# Patient Record
Sex: FEMALE | Race: WHITE | NOT HISPANIC OR LATINO | Employment: UNEMPLOYED | URBAN - METROPOLITAN AREA
[De-identification: names, ages, dates, MRNs, and addresses within clinical notes are randomized per-mention and may not be internally consistent; named-entity substitution may affect disease eponyms.]

---

## 2019-02-11 ENCOUNTER — ROUTINE PRENATAL (OUTPATIENT)
Dept: PERINATAL CARE | Facility: CLINIC | Age: 39
End: 2019-02-11
Payer: COMMERCIAL

## 2019-02-11 VITALS
DIASTOLIC BLOOD PRESSURE: 82 MMHG | WEIGHT: 270 LBS | SYSTOLIC BLOOD PRESSURE: 148 MMHG | BODY MASS INDEX: 44.98 KG/M2 | HEIGHT: 65 IN | HEART RATE: 96 BPM

## 2019-02-11 DIAGNOSIS — O24.111 PRE-EXISTING TYPE 2 DIABETES MELLITUS DURING PREGNANCY IN FIRST TRIMESTER: Primary | ICD-10-CM

## 2019-02-11 DIAGNOSIS — O99.210 OBESITY AFFECTING PREGNANCY, ANTEPARTUM: ICD-10-CM

## 2019-02-11 DIAGNOSIS — O10.911 CHRONIC HYPERTENSION IN OBSTETRIC CONTEXT IN FIRST TRIMESTER: ICD-10-CM

## 2019-02-11 DIAGNOSIS — Z36.82 ENCOUNTER FOR NUCHAL TRANSLUCENCY TESTING: ICD-10-CM

## 2019-02-11 DIAGNOSIS — O09.521 ELDERLY MULTIGRAVIDA IN FIRST TRIMESTER: ICD-10-CM

## 2019-02-11 DIAGNOSIS — Z3A.13 13 WEEKS GESTATION OF PREGNANCY: ICD-10-CM

## 2019-02-11 PROCEDURE — 76813 OB US NUCHAL MEAS 1 GEST: CPT | Performed by: OBSTETRICS & GYNECOLOGY

## 2019-02-11 PROCEDURE — 76801 OB US < 14 WKS SINGLE FETUS: CPT | Performed by: OBSTETRICS & GYNECOLOGY

## 2019-02-11 PROCEDURE — 99242 OFF/OP CONSLTJ NEW/EST SF 20: CPT | Performed by: OBSTETRICS & GYNECOLOGY

## 2019-02-11 RX ORDER — BLOOD SUGAR DIAGNOSTIC
STRIP MISCELLANEOUS
COMMUNITY
Start: 2018-12-27

## 2019-02-11 RX ORDER — LANOLIN ALCOHOL/MO/W.PET/CERES
400 CREAM (GRAM) TOPICAL DAILY
COMMUNITY

## 2019-02-11 RX ORDER — INSULIN HUMAN 100 [IU]/ML
INJECTION, SUSPENSION SUBCUTANEOUS
COMMUNITY
Start: 2018-12-27

## 2019-02-11 RX ORDER — PEN NEEDLE, DIABETIC 31 GX5/16"
NEEDLE, DISPOSABLE MISCELLANEOUS
COMMUNITY
Start: 2018-12-27

## 2019-02-11 RX ORDER — ASPIRIN 81 MG/1
162 TABLET ORAL DAILY
Qty: 180 TABLET | Refills: 3 | Status: SHIPPED | OUTPATIENT
Start: 2019-02-11

## 2019-02-11 RX ORDER — VITAMIN A, VITAMIN C, VITAMIN D-3, VITAMIN E, VITAMIN B-1, VITAMIN B-2, NIACIN, VITAMIN B-6, CALCIUM, IRON, ZINC, COPPER 4000; 120; 400; 22; 1.84; 3; 20; 10; 1; 12; 200; 27; 25; 2 [IU]/1; MG/1; [IU]/1; MG/1; MG/1; MG/1; MG/1; MG/1; MG/1; UG/1; MG/1; MG/1; MG/1; MG/1
TABLET ORAL
COMMUNITY
Start: 2018-12-24

## 2019-02-11 RX ORDER — INSULIN DETEMIR 100 [IU]/ML
INJECTION, SOLUTION SUBCUTANEOUS
COMMUNITY
Start: 2018-12-27

## 2019-02-11 RX ORDER — INSULIN LISPRO 100 [IU]/ML
INJECTION, SOLUTION INTRAVENOUS; SUBCUTANEOUS
COMMUNITY
Start: 2018-12-27

## 2019-02-11 NOTE — PROGRESS NOTES
The patient was seen today for an ultrasound  Please see ultrasound report (located under Ob Procedures) for additional details  Thank you very much for allowing us to participate in the care of this very nice patient  Should you have any questions, please do not hesitate to contact me  MD [place]  Attending Physician, Ilya

## 2019-02-12 ENCOUNTER — TELEPHONE (OUTPATIENT)
Dept: PERINATAL CARE | Facility: CLINIC | Age: 39
End: 2019-02-12

## 2019-02-12 NOTE — TELEPHONE ENCOUNTER
Pt was left a message on her vm in response to her question of the low dase asa that Dr Aman Maria ordered  She was instructed to take 162mg daily until delivery  Pt was also instructed to call the [facility] if she had any further questions

## 2019-02-19 ENCOUNTER — TELEPHONE (OUTPATIENT)
Dept: PERINATAL CARE | Facility: CLINIC | Age: 39
End: 2019-02-19

## 2019-02-19 NOTE — LETTER
02/19/19  Paris Fowler  1980    Thank you for completing the cell-free DNA screen ("non-invasive prenatal screening" or "[id number]")  To obtain comprehensive screening results, please complete blood work for MSAFP (maternal-serum alpha fetoprotein) between the weeks of 3/5/19 to 4/2/19  Based on your insurance coverage, please use one of the following locations  Call our office for any questions at [phone]      Franck Alvarado [facility]   [facility], [facility], [address] [facility]   Phone: [address]  [address] [facility], [address]    Phone: [address], [address] [place] [address]  Phone: [phone] [address]  [facility], [address]   Phone: [phone] Alton Dos Santos for lab)    [address] [facility]  [facility], [address]; [place], [address]   Phone: [phone]    [address] [facility]  [address] [facility], [place] [address]   Phone: [phone]    Sincerely,    Derick Bar RN

## 2019-02-19 NOTE — TELEPHONE ENCOUNTER
Voicemail message left on # from [facility] communication consent with normal results of [id number]  Gender not left on message  Instructions for MSAFP left and TRF mailed  Advised to call nurse line with any questions

## 2019-02-19 NOTE — LETTER
Name: Mandie Mann  : 1980  MRN: [id number]    To:   Centralized Faxing Team [phone]      The attached documents are complete  Please scan and add into the patient's chart  No other action is needed at this time  Please call us with any questions at [phone]      Eric Lara RN

## 2019-03-05 ENCOUNTER — APPOINTMENT (OUTPATIENT)
Dept: LAB | Facility: HOSPITAL | Age: 39
End: 2019-03-05
Payer: COMMERCIAL

## 2019-03-05 ENCOUNTER — TRANSCRIBE ORDERS (OUTPATIENT)
Dept: ADMINISTRATIVE | Facility: HOSPITAL | Age: 39
End: 2019-03-05

## 2019-03-05 DIAGNOSIS — Z36.9 RESEARCH REQUESTED ANTENATAL ULTRASOUND SCAN: ICD-10-CM

## 2019-03-05 DIAGNOSIS — Z36.9 RESEARCH REQUESTED ANTENATAL ULTRASOUND SCAN: Primary | ICD-10-CM

## 2019-03-05 PROCEDURE — 36415 COLL VENOUS BLD VENIPUNCTURE: CPT

## 2019-03-05 PROCEDURE — 82105 ALPHA-FETOPROTEIN SERUM: CPT

## 2019-03-06 ENCOUNTER — ULTRASOUND (OUTPATIENT)
Dept: PERINATAL CARE | Facility: CLINIC | Age: 39
End: 2019-03-06
Payer: COMMERCIAL

## 2019-03-06 VITALS
WEIGHT: 274.4 LBS | HEIGHT: 65 IN | DIASTOLIC BLOOD PRESSURE: 80 MMHG | BODY MASS INDEX: 45.72 KG/M2 | SYSTOLIC BLOOD PRESSURE: 138 MMHG | HEART RATE: 88 BPM

## 2019-03-06 DIAGNOSIS — Z36.3 ENCOUNTER FOR ANTENATAL SCREENING FOR MALFORMATIONS: ICD-10-CM

## 2019-03-06 DIAGNOSIS — O24.112 PRE-EXISTING TYPE 2 DIABETES MELLITUS DURING PREGNANCY IN SECOND TRIMESTER: Primary | ICD-10-CM

## 2019-03-06 DIAGNOSIS — Z3A.16 16 WEEKS GESTATION OF PREGNANCY: ICD-10-CM

## 2019-03-06 DIAGNOSIS — O10.912 CHRONIC HYPERTENSION IN OBSTETRIC CONTEXT IN SECOND TRIMESTER: ICD-10-CM

## 2019-03-06 PROCEDURE — 99212 OFFICE O/P EST SF 10 MIN: CPT | Performed by: OBSTETRICS & GYNECOLOGY

## 2019-03-06 PROCEDURE — 76805 OB US >/= 14 WKS SNGL FETUS: CPT | Performed by: OBSTETRICS & GYNECOLOGY

## 2019-03-06 RX ORDER — FERROUS SULFATE 325(65) MG
325 TABLET ORAL
COMMUNITY

## 2019-03-06 NOTE — PATIENT INSTRUCTIONS
Thank you for choosing [place] [address] for your  care today  If you have any questions about your ultrasound or care, please do not hesitate to contact us or your primary obstetrician  Continue taking your aspirin (Ideally 162mg though 81mg is acceptable) daily for prevention of preeclampsia  If you have asthma and notice an increase in symptom frequency or severity, consider stopping this medication  Please try to keep your weight gain to 11-20 pounds this pregnancy; this is best accomplished by regular aerobic exercise and healthy eating  Pregnant women should aim for 150 minutes weekly of exercise (example, 30 minutes five times per week)

## 2019-03-06 NOTE — PROGRESS NOTES
David Richards: Ms Kenyetta Ba was seen today at 16w2d for anatomic survey ultrasound  See ultrasound report under "OB Procedures" tab  Please don't hesitate to contact our office with any concerns or questions    George Hernandez MD

## 2019-03-07 ENCOUNTER — TELEPHONE (OUTPATIENT)
Dept: PERINATAL CARE | Facility: CLINIC | Age: 39
End: 2019-03-07

## 2019-03-07 LAB
2ND TRIMESTER 4 SCREEN SERPL-IMP: NORMAL
AFP ADJ MOM SERPL: 0.97
AFP INTERP AMN-IMP: NORMAL
AFP INTERP SERPL-IMP: NORMAL
AFP INTERP SERPL-IMP: NORMAL
AFP SERPL-MCNC: 17.7 NG/ML
AGE AT DELIVERY: 40.5 YR
GA METHOD: NORMAL
GA: 16.1 WEEKS
IDDM PATIENT QL: YES
MULTIPLE PREGNANCY: NO
NEURAL TUBE DEFECT RISK FETUS: 4144 %

## 2019-03-07 NOTE — TELEPHONE ENCOUNTER
Left VMM on # provided on communication consent  Results of MSAFP provided and instructed to call office with questions

## 2019-03-07 NOTE — TELEPHONE ENCOUNTER
----- Message from Wendy Ramos MD sent at 3/7/2019  9:21 AM EST -----  [facility] RN staff, I've reviewed this MSAFP result which is normal, can you call her regarding this result? Thank you    Wendy Ramos MD

## 2019-04-03 ENCOUNTER — ROUTINE PRENATAL (OUTPATIENT)
Dept: PERINATAL CARE | Facility: CLINIC | Age: 39
End: 2019-04-03
Payer: COMMERCIAL

## 2019-04-03 VITALS
HEIGHT: 65 IN | HEART RATE: 90 BPM | DIASTOLIC BLOOD PRESSURE: 78 MMHG | SYSTOLIC BLOOD PRESSURE: 138 MMHG | BODY MASS INDEX: 44.98 KG/M2 | WEIGHT: 270 LBS

## 2019-04-03 DIAGNOSIS — O99.210 OBESITY AFFECTING PREGNANCY, ANTEPARTUM: Primary | ICD-10-CM

## 2019-04-03 DIAGNOSIS — Z3A.20 20 WEEKS GESTATION OF PREGNANCY: ICD-10-CM

## 2019-04-03 DIAGNOSIS — O24.112 PRE-EXISTING TYPE 2 DIABETES MELLITUS DURING PREGNANCY IN SECOND TRIMESTER: ICD-10-CM

## 2019-04-03 DIAGNOSIS — O09.521 ELDERLY MULTIGRAVIDA IN FIRST TRIMESTER: ICD-10-CM

## 2019-04-03 DIAGNOSIS — Z36.86 ENCOUNTER FOR ANTENATAL SCREENING FOR CERVICAL LENGTH: ICD-10-CM

## 2019-04-03 DIAGNOSIS — O10.912 CHRONIC HYPERTENSION IN OBSTETRIC CONTEXT IN SECOND TRIMESTER: ICD-10-CM

## 2019-04-03 PROCEDURE — 76817 TRANSVAGINAL US OBSTETRIC: CPT | Performed by: OBSTETRICS & GYNECOLOGY

## 2019-04-03 PROCEDURE — 99212 OFFICE O/P EST SF 10 MIN: CPT | Performed by: OBSTETRICS & GYNECOLOGY

## 2019-04-03 PROCEDURE — 76811 OB US DETAILED SNGL FETUS: CPT | Performed by: OBSTETRICS & GYNECOLOGY

## 2019-04-03 RX ORDER — DOCUSATE SODIUM 100 MG/1
100 CAPSULE, LIQUID FILLED ORAL DAILY
COMMUNITY
End: 2019-06-10 | Stop reason: ALTCHOICE

## 2019-04-15 ENCOUNTER — ROUTINE PRENATAL (OUTPATIENT)
Dept: PERINATAL CARE | Facility: CLINIC | Age: 39
End: 2019-04-15
Payer: COMMERCIAL

## 2019-04-15 VITALS
BODY MASS INDEX: 45.82 KG/M2 | HEART RATE: 85 BPM | HEIGHT: 65 IN | WEIGHT: 275 LBS | SYSTOLIC BLOOD PRESSURE: 130 MMHG | DIASTOLIC BLOOD PRESSURE: 70 MMHG

## 2019-04-15 DIAGNOSIS — O10.912 CHRONIC HYPERTENSION IN OBSTETRIC CONTEXT IN SECOND TRIMESTER: ICD-10-CM

## 2019-04-15 DIAGNOSIS — O24.112 PRE-EXISTING TYPE 2 DIABETES MELLITUS DURING PREGNANCY IN SECOND TRIMESTER: ICD-10-CM

## 2019-04-15 DIAGNOSIS — Z3A.22 22 WEEKS GESTATION OF PREGNANCY: ICD-10-CM

## 2019-04-15 PROCEDURE — 93325 DOPPLER ECHO COLOR FLOW MAPG: CPT | Performed by: OBSTETRICS & GYNECOLOGY

## 2019-04-15 PROCEDURE — 76825 ECHO EXAM OF FETAL HEART: CPT | Performed by: OBSTETRICS & GYNECOLOGY

## 2019-04-15 PROCEDURE — 76827 ECHO EXAM OF FETAL HEART: CPT | Performed by: OBSTETRICS & GYNECOLOGY

## 2019-05-13 ENCOUNTER — ULTRASOUND (OUTPATIENT)
Dept: PERINATAL CARE | Facility: CLINIC | Age: 39
End: 2019-05-13
Payer: COMMERCIAL

## 2019-05-13 VITALS
HEIGHT: 65 IN | HEART RATE: 80 BPM | DIASTOLIC BLOOD PRESSURE: 68 MMHG | WEIGHT: 281 LBS | BODY MASS INDEX: 46.82 KG/M2 | SYSTOLIC BLOOD PRESSURE: 126 MMHG

## 2019-05-13 DIAGNOSIS — Z3A.26 26 WEEKS GESTATION OF PREGNANCY: ICD-10-CM

## 2019-05-13 DIAGNOSIS — E66.01 MATERNAL MORBID OBESITY IN SECOND TRIMESTER, ANTEPARTUM (HCC): ICD-10-CM

## 2019-05-13 DIAGNOSIS — O10.912 CHRONIC HYPERTENSION IN OBSTETRIC CONTEXT IN SECOND TRIMESTER: ICD-10-CM

## 2019-05-13 DIAGNOSIS — O99.212 MATERNAL MORBID OBESITY IN SECOND TRIMESTER, ANTEPARTUM (HCC): ICD-10-CM

## 2019-05-13 DIAGNOSIS — O09.522 ELDERLY MULTIGRAVIDA, SECOND TRIMESTER: ICD-10-CM

## 2019-05-13 DIAGNOSIS — O24.112 PRE-EXISTING TYPE 2 DIABETES MELLITUS DURING PREGNANCY IN SECOND TRIMESTER: Primary | ICD-10-CM

## 2019-05-13 PROCEDURE — 76816 OB US FOLLOW-UP PER FETUS: CPT | Performed by: OBSTETRICS & GYNECOLOGY

## 2019-05-13 PROCEDURE — 99212 OFFICE O/P EST SF 10 MIN: CPT | Performed by: OBSTETRICS & GYNECOLOGY

## 2019-06-07 PROBLEM — Z3A.20 20 WEEKS GESTATION OF PREGNANCY: Status: RESOLVED | Noted: 2019-02-11 | Resolved: 2019-06-07

## 2019-06-10 ENCOUNTER — ULTRASOUND (OUTPATIENT)
Dept: PERINATAL CARE | Facility: CLINIC | Age: 39
End: 2019-06-10
Payer: COMMERCIAL

## 2019-06-10 VITALS
SYSTOLIC BLOOD PRESSURE: 138 MMHG | WEIGHT: 286 LBS | HEIGHT: 65 IN | BODY MASS INDEX: 47.65 KG/M2 | HEART RATE: 104 BPM | DIASTOLIC BLOOD PRESSURE: 58 MMHG

## 2019-06-10 DIAGNOSIS — O24.113 PRE-EXISTING TYPE 2 DIABETES MELLITUS DURING PREGNANCY IN THIRD TRIMESTER: Primary | ICD-10-CM

## 2019-06-10 DIAGNOSIS — Z36.89 ENCOUNTER FOR ULTRASOUND TO CHECK FETAL GROWTH: ICD-10-CM

## 2019-06-10 DIAGNOSIS — Z3A.30 30 WEEKS GESTATION OF PREGNANCY: ICD-10-CM

## 2019-06-10 PROCEDURE — 76816 OB US FOLLOW-UP PER FETUS: CPT | Performed by: OBSTETRICS & GYNECOLOGY

## 2019-07-15 ENCOUNTER — ULTRASOUND (OUTPATIENT)
Dept: PERINATAL CARE | Facility: CLINIC | Age: 39
End: 2019-07-15
Payer: COMMERCIAL

## 2019-07-15 VITALS
SYSTOLIC BLOOD PRESSURE: 134 MMHG | HEART RATE: 96 BPM | WEIGHT: 284 LBS | BODY MASS INDEX: 47.32 KG/M2 | HEIGHT: 65 IN | DIASTOLIC BLOOD PRESSURE: 84 MMHG

## 2019-07-15 DIAGNOSIS — O24.113 PRE-EXISTING TYPE 2 DIABETES MELLITUS DURING PREGNANCY IN THIRD TRIMESTER: Primary | ICD-10-CM

## 2019-07-15 DIAGNOSIS — O99.213 MATERNAL MORBID OBESITY IN THIRD TRIMESTER, ANTEPARTUM (HCC): ICD-10-CM

## 2019-07-15 DIAGNOSIS — E66.01 MATERNAL MORBID OBESITY IN THIRD TRIMESTER, ANTEPARTUM (HCC): ICD-10-CM

## 2019-07-15 DIAGNOSIS — O09.523 ELDERLY MULTIGRAVIDA, THIRD TRIMESTER: ICD-10-CM

## 2019-07-15 DIAGNOSIS — O10.913 MATERNAL CHRONIC HYPERTENSION, THIRD TRIMESTER: ICD-10-CM

## 2019-07-15 DIAGNOSIS — Z3A.35 35 WEEKS GESTATION OF PREGNANCY: ICD-10-CM

## 2019-07-15 PROCEDURE — 99212 OFFICE O/P EST SF 10 MIN: CPT | Performed by: OBSTETRICS & GYNECOLOGY

## 2019-07-15 PROCEDURE — 76816 OB US FOLLOW-UP PER FETUS: CPT | Performed by: OBSTETRICS & GYNECOLOGY

## 2019-07-15 RX ORDER — ACETAMINOPHEN 500 MG
500 TABLET ORAL EVERY 6 HOURS PRN
COMMUNITY

## 2019-07-15 NOTE — PROGRESS NOTES
Please refer to the [facility] ultrasound report in Ob Procedures for additional information regarding the visit to the [facility]  today

## 2019-07-17 ENCOUNTER — TELEPHONE (OUTPATIENT)
Dept: PERINATAL CARE | Facility: CLINIC | Age: 39
End: 2019-07-17

## 2019-07-17 NOTE — TELEPHONE ENCOUNTER
JOCELYNN Attending Jose Gutierrez), late entry:     Took phone call from Dr Salvador De Dios last night  Dr Salvador De Dios had noted that, when the patient was on Labor and delivery for usual monitoring, initial blood pressures were 170/100  On repeat they were in the 150s over 80s to 90s  She reports that the patient was asymptomatic  She also reports that the patient has significant psychosocial limitations which would preclude inpatient hospitalization  This is a patient who we have been following with ultrasound for medical comorbidities who has not disclosed much in the way of endocrinology input on her diabetes which is done by an outside team   She has baseline underlying chronic hypertension requiring no medication  Discussed with Dr Salvador De Dios the possibilities of underlying differential diagnosis at this time which include, exacerbation of underlying chronic hypertension, antihypertensive medication non adherence (unlikely given she is not prescribed antihypertensive medication), superimposed preeclampsia without severe features, or superimposed preeclampsia with severe features  Dr Salvador De Dios reports no lab abnormalities and UPC ratio less than 0 3, as well as absence of maternal symptoms  Given lack of sustained blood pressure elevation, exacerbation of underlying chronic hypertension seems more likely than superimposed preeclampsia with severe features, however, possibility remains of superimposed preeclampsia without severe features  We discussed management options right now  Without sustained severe elevation I do not believe that she requires IV antihypertensive medication at this time  We did discuss the possibility of initiation of Procardia XL medication however I explained that I typically recommend inpatient observation for at least 24h after starting this medication  Dr Salvador De Dios indicates the patient cannot stay    I do not believe this medication would bottom out her blood pressures as an outpatientand, while she cannot stay in the hospital, her pressures being in the 150s over 90s would potentially benefit from initiation of a low-dose of an antihypertensive agent  Recommended delivery be timed at 37 weeks given comorbidities as well as new issues regarding hypertension, though she may need to be delivered earlier  Discussed also the option of betamethasone which for now would hold on given diabetes and unclear regimen of insulin but discussed with Dr Omi Dudley her option of contacting [facility]'s endocrinologist for guidance  Discussed that administration of betamethasone would result in profound and several days of sustained hyperglycemia without any insulin drip coverage  All questions of Dr Strickland Client answered  Explained that I am on-call week should she have any additional questions    Sincerely,    Luciana Alpers, MD  Maternal-Fetal Medicine